# Patient Record
Sex: MALE | Race: WHITE | NOT HISPANIC OR LATINO | URBAN - METROPOLITAN AREA
[De-identification: names, ages, dates, MRNs, and addresses within clinical notes are randomized per-mention and may not be internally consistent; named-entity substitution may affect disease eponyms.]

---

## 2023-05-04 ENCOUNTER — APPOINTMENT (OUTPATIENT)
Dept: PRIMARY CARE | Facility: CLINIC | Age: 23
End: 2023-05-04
Payer: COMMERCIAL

## 2023-05-25 ENCOUNTER — OFFICE VISIT (OUTPATIENT)
Dept: PRIMARY CARE | Facility: CLINIC | Age: 23
End: 2023-05-25
Payer: COMMERCIAL

## 2023-05-25 ENCOUNTER — LAB (OUTPATIENT)
Dept: LAB | Facility: LAB | Age: 23
End: 2023-05-25
Payer: COMMERCIAL

## 2023-05-25 VITALS
BODY MASS INDEX: 18.55 KG/M2 | WEIGHT: 140 LBS | TEMPERATURE: 97.9 F | RESPIRATION RATE: 16 BRPM | OXYGEN SATURATION: 98 % | SYSTOLIC BLOOD PRESSURE: 120 MMHG | HEART RATE: 64 BPM | DIASTOLIC BLOOD PRESSURE: 70 MMHG | HEIGHT: 73 IN

## 2023-05-25 DIAGNOSIS — F90.2 ATTENTION DEFICIT HYPERACTIVITY DISORDER (ADHD), COMBINED TYPE: ICD-10-CM

## 2023-05-25 DIAGNOSIS — Z00.00 HEALTHCARE MAINTENANCE: Primary | ICD-10-CM

## 2023-05-25 DIAGNOSIS — Z00.00 HEALTHCARE MAINTENANCE: ICD-10-CM

## 2023-05-25 PROBLEM — M54.9 BACK PAIN: Status: RESOLVED | Noted: 2022-03-31 | Resolved: 2023-05-25

## 2023-05-25 PROBLEM — H66.001 ACUTE SUPPURATIVE OTITIS MEDIA OF RIGHT EAR WITHOUT SPONTANEOUS RUPTURE OF TYMPANIC MEMBRANE: Status: RESOLVED | Noted: 2023-05-25 | Resolved: 2023-05-25

## 2023-05-25 PROBLEM — H66.003 ACUTE SUPPURATIVE OTITIS MEDIA OF BOTH EARS WITHOUT SPONTANEOUS RUPTURE OF TYMPANIC MEMBRANES: Status: RESOLVED | Noted: 2023-05-25 | Resolved: 2023-05-25

## 2023-05-25 PROBLEM — R10.9 ABDOMINAL PAIN: Status: RESOLVED | Noted: 2023-05-25 | Resolved: 2023-05-25

## 2023-05-25 PROBLEM — J30.9 ALLERGIC RHINITIS: Status: ACTIVE | Noted: 2023-05-25

## 2023-05-25 PROBLEM — F90.9 ATTENTION DEFICIT HYPERACTIVITY DISORDER (ADHD): Status: ACTIVE | Noted: 2023-05-25

## 2023-05-25 LAB
ALANINE AMINOTRANSFERASE (SGPT) (U/L) IN SER/PLAS: 12 U/L (ref 10–52)
ALBUMIN (G/DL) IN SER/PLAS: 4.7 G/DL (ref 3.4–5)
ALKALINE PHOSPHATASE (U/L) IN SER/PLAS: 59 U/L (ref 33–120)
ANION GAP IN SER/PLAS: 10 MMOL/L (ref 10–20)
ASPARTATE AMINOTRANSFERASE (SGOT) (U/L) IN SER/PLAS: 15 U/L (ref 9–39)
BILIRUBIN TOTAL (MG/DL) IN SER/PLAS: 0.6 MG/DL (ref 0–1.2)
CALCIUM (MG/DL) IN SER/PLAS: 9.7 MG/DL (ref 8.6–10.3)
CARBON DIOXIDE, TOTAL (MMOL/L) IN SER/PLAS: 30 MMOL/L (ref 21–32)
CHLORIDE (MMOL/L) IN SER/PLAS: 106 MMOL/L (ref 98–107)
CHOLESTEROL (MG/DL) IN SER/PLAS: 102 MG/DL (ref 0–199)
CHOLESTEROL IN HDL (MG/DL) IN SER/PLAS: 41.9 MG/DL
CHOLESTEROL/HDL RATIO: 2.4
CREATININE (MG/DL) IN SER/PLAS: 0.84 MG/DL (ref 0.5–1.3)
GFR MALE: >90 ML/MIN/1.73M2
GLUCOSE (MG/DL) IN SER/PLAS: 74 MG/DL (ref 74–99)
LDL: 48 MG/DL (ref 0–119)
NON HDL CHOLESTEROL: 60 MG/DL (ref 0–149)
POTASSIUM (MMOL/L) IN SER/PLAS: 4 MMOL/L (ref 3.5–5.3)
PROTEIN TOTAL: 6.8 G/DL (ref 6.4–8.2)
SODIUM (MMOL/L) IN SER/PLAS: 142 MMOL/L (ref 136–145)
TRIGLYCERIDE (MG/DL) IN SER/PLAS: 63 MG/DL (ref 0–149)
UREA NITROGEN (MG/DL) IN SER/PLAS: 8 MG/DL (ref 6–23)
VLDL: 13 MG/DL (ref 0–40)

## 2023-05-25 PROCEDURE — 93000 ELECTROCARDIOGRAM COMPLETE: CPT | Performed by: FAMILY MEDICINE

## 2023-05-25 PROCEDURE — 99395 PREV VISIT EST AGE 18-39: CPT | Performed by: FAMILY MEDICINE

## 2023-05-25 PROCEDURE — 1036F TOBACCO NON-USER: CPT | Performed by: FAMILY MEDICINE

## 2023-05-25 PROCEDURE — 80061 LIPID PANEL: CPT

## 2023-05-25 PROCEDURE — 80053 COMPREHEN METABOLIC PANEL: CPT

## 2023-05-25 PROCEDURE — 36415 COLL VENOUS BLD VENIPUNCTURE: CPT

## 2023-05-25 RX ORDER — CETIRIZINE HYDROCHLORIDE 10 MG/1
TABLET ORAL
COMMUNITY

## 2023-05-25 RX ORDER — METHYLPHENIDATE HYDROCHLORIDE 27 MG/1
2 TABLET ORAL DAILY
COMMUNITY
End: 2023-05-25 | Stop reason: SDUPTHER

## 2023-05-25 RX ORDER — METHYLPHENIDATE HYDROCHLORIDE 27 MG/1
54 TABLET ORAL DAILY
Qty: 60 TABLET | Refills: 0 | Status: SHIPPED | OUTPATIENT
Start: 2023-05-25

## 2023-05-25 ASSESSMENT — PROMIS GLOBAL HEALTH SCALE
RATE_PHYSICAL_HEALTH: GOOD
RATE_AVERAGE_FATIGUE: MILD
RATE_SOCIAL_SATISFACTION: VERY GOOD
RATE_AVERAGE_PAIN: 1
RATE_MENTAL_HEALTH: GOOD
CARRYOUT_SOCIAL_ACTIVITIES: VERY GOOD
EMOTIONAL_PROBLEMS: RARELY
CARRYOUT_PHYSICAL_ACTIVITIES: MOSTLY
RATE_QUALITY_OF_LIFE: VERY GOOD
RATE_GENERAL_HEALTH: GOOD

## 2023-05-25 ASSESSMENT — ENCOUNTER SYMPTOMS
PSYCHIATRIC NEGATIVE: 1
RESPIRATORY NEGATIVE: 1
EYES NEGATIVE: 1
NEUROLOGICAL NEGATIVE: 1
ENDOCRINE NEGATIVE: 1
GASTROINTESTINAL NEGATIVE: 1
CONSTITUTIONAL NEGATIVE: 1
CARDIOVASCULAR NEGATIVE: 1

## 2023-05-25 ASSESSMENT — PATIENT HEALTH QUESTIONNAIRE - PHQ9
SUM OF ALL RESPONSES TO PHQ9 QUESTIONS 1 AND 2: 0
1. LITTLE INTEREST OR PLEASURE IN DOING THINGS: NOT AT ALL
2. FEELING DOWN, DEPRESSED OR HOPELESS: NOT AT ALL

## 2023-05-25 NOTE — PROGRESS NOTES
"Subjective   Patient ID: Ant Jones is a 23 y.o. male who presents for Annual Exam.    HPI Takes concerta prn for focus.     Review of Systems   Constitutional: Negative.    HENT: Negative.     Eyes: Negative.    Respiratory: Negative.     Cardiovascular: Negative.    Gastrointestinal: Negative.    Endocrine: Negative.    Genitourinary: Negative.    Skin: Negative.    Neurological: Negative.    Psychiatric/Behavioral: Negative.         Objective   /70 (BP Location: Left arm, Patient Position: Sitting)   Pulse 64   Temp 36.6 °C (97.9 °F)   Resp 16   Ht 1.854 m (6' 1\")   Wt 63.5 kg (140 lb)   SpO2 98%   BMI 18.47 kg/m²     Physical Exam  Constitutional:       General: He is not in acute distress.     Appearance: Normal appearance.   HENT:      Head: Normocephalic and atraumatic.      Right Ear: Tympanic membrane normal.      Left Ear: Tympanic membrane normal.      Nose: Nose normal.      Mouth/Throat:      Mouth: Mucous membranes are dry.      Pharynx: Oropharynx is clear.   Eyes:      Conjunctiva/sclera: Conjunctivae normal.      Pupils: Pupils are equal, round, and reactive to light.   Neck:      Vascular: No carotid bruit.   Cardiovascular:      Rate and Rhythm: Normal rate and regular rhythm.      Heart sounds: Normal heart sounds.   Pulmonary:      Effort: Pulmonary effort is normal.      Breath sounds: Normal breath sounds.   Abdominal:      General: Bowel sounds are normal.      Palpations: Abdomen is soft.   Musculoskeletal:      Cervical back: Neck supple. No tenderness.   Skin:     General: Skin is warm and dry.   Neurological:      General: No focal deficit present.      Mental Status: He is alert.   Psychiatric:         Mood and Affect: Mood normal.         Behavior: Behavior normal.         Assessment/Plan   Problem List Items Addressed This Visit       Attention deficit hyperactivity disorder (ADHD)    Relevant Medications    methylphenidate (Concerta) 27 mg daily tablet     Other Visit " Diagnoses       Healthcare maintenance    -  Primary    Relevant Orders    ECG 12 lead (Clinic Performed)    CBC    Comprehensive Metabolic Panel    Lipid Panel

## 2025-01-16 ENCOUNTER — APPOINTMENT (OUTPATIENT)
Facility: CLINIC | Age: 25
End: 2025-01-16
Payer: COMMERCIAL

## 2025-01-16 VITALS
HEART RATE: 65 BPM | TEMPERATURE: 98.5 F | OXYGEN SATURATION: 99 % | RESPIRATION RATE: 16 BRPM | HEIGHT: 73 IN | DIASTOLIC BLOOD PRESSURE: 60 MMHG | BODY MASS INDEX: 18.5 KG/M2 | SYSTOLIC BLOOD PRESSURE: 90 MMHG | WEIGHT: 139.6 LBS

## 2025-01-16 DIAGNOSIS — Z13.6 SCREENING FOR CARDIOVASCULAR CONDITION: Primary | ICD-10-CM

## 2025-01-16 DIAGNOSIS — Z00.00 HEALTHCARE MAINTENANCE: ICD-10-CM

## 2025-01-16 PROCEDURE — 1036F TOBACCO NON-USER: CPT | Performed by: FAMILY MEDICINE

## 2025-01-16 PROCEDURE — 99395 PREV VISIT EST AGE 18-39: CPT | Performed by: FAMILY MEDICINE

## 2025-01-16 PROCEDURE — 3008F BODY MASS INDEX DOCD: CPT | Performed by: FAMILY MEDICINE

## 2025-01-16 PROCEDURE — 93000 ELECTROCARDIOGRAM COMPLETE: CPT | Performed by: FAMILY MEDICINE

## 2025-01-16 ASSESSMENT — ENCOUNTER SYMPTOMS
RESPIRATORY NEGATIVE: 1
EYES NEGATIVE: 1
GASTROINTESTINAL NEGATIVE: 1
NEUROLOGICAL NEGATIVE: 1
CONSTITUTIONAL NEGATIVE: 1
CARDIOVASCULAR NEGATIVE: 1
PSYCHIATRIC NEGATIVE: 1
ENDOCRINE NEGATIVE: 1

## 2025-01-16 NOTE — PROGRESS NOTES
"Subjective     Patient ID: Ant Jones is a 24 y.o. male who presents for Annual Exam.      Review of Systems   Constitutional: Negative.    HENT: Negative.     Eyes: Negative.    Respiratory: Negative.     Cardiovascular: Negative.    Gastrointestinal: Negative.    Endocrine: Negative.    Genitourinary: Negative.    Skin: Negative.    Neurological: Negative.    Psychiatric/Behavioral: Negative.         Objective     Vitals:    01/16/25 1005   BP: 90/60   BP Location: Right arm   Patient Position: Sitting   Pulse: 65   Resp: 16   Temp: 36.9 °C (98.5 °F)   SpO2: 99%   Weight: 63.3 kg (139 lb 9.6 oz)   Height: 1.854 m (6' 1\")        Current Outpatient Medications   Medication Instructions    cetirizine (ZyrTEC) 10 mg tablet Take by mouth.    methylphenidate ER 54 mg, oral, Daily        Physical Exam  Constitutional:       General: He is not in acute distress.     Appearance: Normal appearance.   HENT:      Head: Normocephalic and atraumatic.      Right Ear: Tympanic membrane normal.      Left Ear: Tympanic membrane normal.      Nose: Nose normal.      Mouth/Throat:      Pharynx: Oropharynx is clear.   Eyes:      Conjunctiva/sclera: Conjunctivae normal.      Pupils: Pupils are equal, round, and reactive to light.   Neck:      Vascular: No carotid bruit.   Cardiovascular:      Rate and Rhythm: Normal rate and regular rhythm.      Heart sounds: Normal heart sounds.   Pulmonary:      Effort: Pulmonary effort is normal.      Breath sounds: Normal breath sounds.   Abdominal:      General: Bowel sounds are normal.      Palpations: Abdomen is soft.   Musculoskeletal:      Cervical back: Neck supple. No tenderness.   Skin:     General: Skin is warm and dry.   Neurological:      General: No focal deficit present.      Mental Status: He is alert.   Psychiatric:         Mood and Affect: Mood normal.         Behavior: Behavior normal.         Assessment/Plan   Diagnoses and all orders for this visit:  Screening for cardiovascular " condition  -     ECG 12 Lead  Healthcare maintenance  -     CBC; Future  -     Comprehensive Metabolic Panel; Future  -     Lipid Panel; Future

## 2025-02-14 LAB
ALBUMIN SERPL-MCNC: 5.3 G/DL (ref 3.6–5.1)
ALP SERPL-CCNC: 61 U/L (ref 36–130)
ALT SERPL-CCNC: 11 U/L (ref 9–46)
ANION GAP SERPL CALCULATED.4IONS-SCNC: 12 MMOL/L (CALC) (ref 7–17)
AST SERPL-CCNC: 17 U/L (ref 10–40)
BILIRUB SERPL-MCNC: 1 MG/DL (ref 0.2–1.2)
BUN SERPL-MCNC: 9 MG/DL (ref 7–25)
CALCIUM SERPL-MCNC: 10.2 MG/DL (ref 8.6–10.3)
CHLORIDE SERPL-SCNC: 105 MMOL/L (ref 98–110)
CHOLEST SERPL-MCNC: 127 MG/DL
CHOLEST/HDLC SERPL: 2.6 (CALC)
CO2 SERPL-SCNC: 26 MMOL/L (ref 20–32)
CREAT SERPL-MCNC: 0.74 MG/DL (ref 0.6–1.24)
EGFRCR SERPLBLD CKD-EPI 2021: 129 ML/MIN/1.73M2
ERYTHROCYTE [DISTWIDTH] IN BLOOD BY AUTOMATED COUNT: 11.7 % (ref 11–15)
GLUCOSE SERPL-MCNC: 79 MG/DL (ref 65–99)
HCT VFR BLD AUTO: 48.3 % (ref 38.5–50)
HDLC SERPL-MCNC: 48 MG/DL
HGB BLD-MCNC: 16 G/DL (ref 13.2–17.1)
LDLC SERPL CALC-MCNC: 66 MG/DL (CALC)
MCH RBC QN AUTO: 31.1 PG (ref 27–33)
MCHC RBC AUTO-ENTMCNC: 33.1 G/DL (ref 32–36)
MCV RBC AUTO: 93.8 FL (ref 80–100)
NONHDLC SERPL-MCNC: 79 MG/DL (CALC)
PLATELET # BLD AUTO: 212 THOUSAND/UL (ref 140–400)
PMV BLD REES-ECKER: 12 FL (ref 7.5–12.5)
POTASSIUM SERPL-SCNC: 4.3 MMOL/L (ref 3.5–5.3)
PROT SERPL-MCNC: 7.4 G/DL (ref 6.1–8.1)
RBC # BLD AUTO: 5.15 MILLION/UL (ref 4.2–5.8)
SODIUM SERPL-SCNC: 143 MMOL/L (ref 135–146)
TRIGL SERPL-MCNC: 46 MG/DL
WBC # BLD AUTO: 4.2 THOUSAND/UL (ref 3.8–10.8)

## 2025-02-21 ENCOUNTER — OFFICE VISIT (OUTPATIENT)
Dept: URGENT CARE | Age: 25
End: 2025-02-21
Payer: COMMERCIAL

## 2025-02-21 VITALS
HEIGHT: 74 IN | OXYGEN SATURATION: 98 % | DIASTOLIC BLOOD PRESSURE: 65 MMHG | WEIGHT: 140 LBS | BODY MASS INDEX: 17.97 KG/M2 | TEMPERATURE: 98.9 F | SYSTOLIC BLOOD PRESSURE: 106 MMHG | RESPIRATION RATE: 19 BRPM | HEART RATE: 80 BPM

## 2025-02-21 DIAGNOSIS — S09.90XA CLOSED HEAD INJURY, INITIAL ENCOUNTER: Primary | ICD-10-CM

## 2025-02-21 ASSESSMENT — ENCOUNTER SYMPTOMS
PHOTOPHOBIA: 1
NUMBNESS: 0
WEAKNESS: 0
HEADACHES: 1

## 2025-02-21 NOTE — PATIENT INSTRUCTIONS
ER if severe headache, vomiting, lethargy, confusion, numbness, weakness     Alternate tylenol and motrin for pain

## 2025-02-21 NOTE — LETTER
February 21, 2025     Patient: Ant Jones   YOB: 2000   Date of Visit: 2/21/2025       To Whom It May Concern:    It is my medical opinion that Ant Jones may return to work on 2/24/25 .    If you have any questions or concerns, please don't hesitate to call.         Sincerely,        Fina Childs PA-C    CC: No Recipients

## 2025-02-21 NOTE — PROGRESS NOTES
Subjective   Patient ID: Ant Jones is a 25 y.o. male. They present today with a chief complaint of Concussion (Hit his head on Tuesday and has been fine but today he has a headache some pain ).    History of Present Illness  Patient is a 25 year old male presenting for evaluation of a head injury. Was attempting to get out from under a table and stood up too quickly and hit back of head on the table. No LOC. No blood thinners. Reports injury occurred at work 4 days ago. Reports mild pain after the injury for a few days. Today worsened, reports was working in a loud and bright area which he thinks made it worse. Headache is global and is tension/tight feeling. Associated with photophobia. Improved with tylenol. No history of headaches or migraines prior.  Denies other injuries.       History provided by:  Patient   used: No    Concussion  Associated symptoms: headaches        Past Medical History  Allergies as of 02/21/2025 - Reviewed 02/21/2025   Allergen Reaction Noted    Cefixime Rash 04/03/2016       (Not in a hospital admission)       Past Medical History:   Diagnosis Date    Abdominal pain 05/25/2023    Acute pharyngitis, unspecified 12/02/2015    Acute pharyngitis, unspecified etiology    Acute suppurative otitis media of both ears without spontaneous rupture of tympanic membranes 05/25/2023    Acute suppurative otitis media of right ear without spontaneous rupture of tympanic membrane 05/25/2023    Acute upper respiratory infection, unspecified 12/02/2015    Upper respiratory infection, acute    Allergic     Back pain 03/31/2022    Constipation, unspecified 11/21/2016    Constipation, unspecified constipation type    Cough, unspecified 12/02/2015    Cough    Generalized abdominal pain 11/21/2016    Generalized abdominal pain    GERD (gastroesophageal reflux disease)     Personal history of other diseases of the respiratory system 12/17/2014    History of acute sinusitis    Personal  "history of other infectious and parasitic diseases 11/21/2016    History of traveler's diarrhea       Past Surgical History:   Procedure Laterality Date    WISDOM TOOTH EXTRACTION          reports that he has never smoked. He has never been exposed to tobacco smoke. He has never used smokeless tobacco. He reports current alcohol use of about 3.0 standard drinks of alcohol per week. He reports that he does not use drugs.    Review of Systems  Review of Systems   Eyes:  Positive for photophobia.   Neurological:  Positive for headaches. Negative for weakness and numbness.                                  Objective    Vitals:    02/21/25 1512   BP: 106/65   Pulse: 80   Resp: 19   Temp: 37.2 °C (98.9 °F)   SpO2: 98%   Weight: 63.5 kg (140 lb)   Height: 1.88 m (6' 2\")     No LMP for male patient.    Physical Exam  Constitutional:       General: He is not in acute distress.     Appearance: Normal appearance. He is normal weight. He is not ill-appearing or toxic-appearing.   HENT:      Head: Normocephalic. No right periorbital erythema or left periorbital erythema.      Jaw: There is normal jaw occlusion. No trismus.      Right Ear: Tympanic membrane, ear canal and external ear normal. There is no impacted cerumen.      Left Ear: Tympanic membrane, ear canal and external ear normal. There is no impacted cerumen.      Mouth/Throat:      Mouth: Mucous membranes are moist.      Pharynx: No oropharyngeal exudate or posterior oropharyngeal erythema.   Eyes:      General: No scleral icterus.        Right eye: No discharge.         Left eye: No discharge.      Conjunctiva/sclera: Conjunctivae normal.   Neck:      Trachea: Phonation normal.   Cardiovascular:      Rate and Rhythm: Normal rate and regular rhythm.      Heart sounds: Normal heart sounds. No murmur heard.     No friction rub. No gallop.   Pulmonary:      Effort: Pulmonary effort is normal. No respiratory distress.      Breath sounds: Normal breath sounds. No stridor. No " wheezing, rhonchi or rales.   Neurological:      General: No focal deficit present.      Mental Status: He is alert.      Gait: Gait normal.   Psychiatric:         Mood and Affect: Mood normal.         Behavior: Behavior normal.         Thought Content: Thought content normal.         Procedures    Point of Care Test & Imaging Results from this visit  No results found for this visit on 02/21/25.   No results found.    Diagnostic study results (if any) were reviewed by Fina Childs PA-C.    Assessment/Plan   Allergies, medications, history, and pertinent labs/EKGs/Imaging reviewed by Fina Childs PA-C.     Medical Decision Making  Patient is a 25 year old male presenting for evaluation of a headache after head injury 4 days ago at work. Offered workers comp, patient declined.  Hemodynamically stable. No signs of head trauma on exam. No hemotympanum, echavarria sign or racoon eyes. Cranial nerves 2-12 intact. No focal neuro deficit. [place] head CT rules negative. Suspect closed head injury/mild concussion. Discussed supportive care. ER if severe headache, vomiting, confusion, lethargy, weakness, numbness.     At time of discharge, patient was clinically well-appearing and appropriate for outpatient management. The patient/parent/guardian was educated regarding diagnosis, supportive care, OTC and Rx medications. The patient/parent/guardian was given the opportunity to ask questions prior to discharge. They verbalized understanding of discussion of treatment plan, expected course of illness and/or injury, indications on when to return to , when to seek further evaluation in ED/call 911, and the need to follow up with PCP and/or specialist as referred. Patient/parent/guardian was provided with work/school documentation if requested. Patient stable upon discharge.     Orders and Diagnoses  Diagnoses and all orders for this visit:  Closed head injury, initial encounter      Medical Admin Record      Patient  disposition: Home    Electronically signed by Fina Childs PA-C  3:45 PM

## 2025-04-21 ENCOUNTER — OFFICE VISIT (OUTPATIENT)
Dept: URGENT CARE | Age: 25
End: 2025-04-21
Payer: COMMERCIAL

## 2025-04-21 VITALS
HEART RATE: 79 BPM | RESPIRATION RATE: 16 BRPM | HEIGHT: 73 IN | TEMPERATURE: 98.2 F | DIASTOLIC BLOOD PRESSURE: 64 MMHG | WEIGHT: 134 LBS | SYSTOLIC BLOOD PRESSURE: 115 MMHG | OXYGEN SATURATION: 99 % | BODY MASS INDEX: 17.76 KG/M2

## 2025-04-21 DIAGNOSIS — N50.812 PAIN IN LEFT TESTICLE: Primary | ICD-10-CM

## 2025-04-21 PROCEDURE — 99213 OFFICE O/P EST LOW 20 MIN: CPT | Performed by: FAMILY MEDICINE

## 2025-04-21 PROCEDURE — 1036F TOBACCO NON-USER: CPT | Performed by: FAMILY MEDICINE

## 2025-04-21 PROCEDURE — 3008F BODY MASS INDEX DOCD: CPT | Performed by: FAMILY MEDICINE

## 2025-04-21 ASSESSMENT — PATIENT HEALTH QUESTIONNAIRE - PHQ9
1. LITTLE INTEREST OR PLEASURE IN DOING THINGS: NOT AT ALL
2. FEELING DOWN, DEPRESSED OR HOPELESS: NOT AT ALL
SUM OF ALL RESPONSES TO PHQ9 QUESTIONS 1 AND 2: 0

## 2025-04-21 NOTE — PROGRESS NOTES
"Subjective   Patient ID: Ant Jones is a 25 y.o. male. They present today with a chief complaint of left testicle pain (2 days ago. Continuous. Radiating down left leg, depending on patients positioning. No injury. No urinary problems).    History of Present Illness  HPI    Past Medical History  Allergies as of 04/21/2025 - Reviewed 04/21/2025   Allergen Reaction Noted    Cefixime Rash 04/03/2016       Prescriptions Prior to Admission[1]     Medical History[2]    Surgical History[3]     reports that he has never smoked. He has never been exposed to tobacco smoke. He has never used smokeless tobacco. He reports current alcohol use of about 3.0 standard drinks of alcohol per week. He reports that he does not use drugs.    Review of Systems  Review of Systems     As in history of present illness                          Objective    Vitals:    04/21/25 1507   BP: 115/64   BP Location: Right arm   Patient Position: Sitting   BP Cuff Size: Large adult   Pulse: 79   Resp: 16   Temp: 36.8 °C (98.2 °F)   TempSrc: Oral   SpO2: 99%   Weight: 60.8 kg (134 lb)   Height: 1.854 m (6' 1\")     No LMP for male patient.    Physical Exam  Alert and oriented, no apparent distress  Abdomen soft, nontender, nondistended.   Left hip-normal passive range of motion in flexion extension internal and external rotation with no elicited pain  Skin shows no rashes sores or lesions   exam shows no areas of significant tenderness, no mass and no swelling over left testicle, scrotum or epididymis  Procedures    Point of Care Test & Imaging Results from this visit  No results found for this visit on 04/21/25.   Imaging  No results found.    Cardiology, Vascular, and Other Imaging  No other imaging results found for the past 2 days      Diagnostic study results (if any) were reviewed by Jeff Noel MD.    Assessment/Plan   Allergies, medications, history, and pertinent labs/EKGs/Imaging reviewed by Jeff Noel MD.     Medical Decision " Making  At time of discharge patient was clinically well-appearing and HDS for outpatient management. The patient and/or family was educated regarding diagnosis, supportive care, OTC and Rx medications. The patient and/or family was given the opportunity to ask questions prior to discharge.  They verbalized understanding of my discussion of the plans for treatment, expected course, indications to return to  or seek further evaluation in ED, and the need for timely follow up as directed.   They were provided with a work/school excuse if requested.    Orders and Diagnoses  Diagnoses and all orders for this visit:  Pain in left testicle      Medical Admin Record      Patient disposition: Home    Electronically signed by Jeff Noel MD  3:31 PM           [1] (Not in a hospital admission)   [2]   Past Medical History:  Diagnosis Date    Abdominal pain 05/25/2023    Acute pharyngitis, unspecified 12/02/2015    Acute pharyngitis, unspecified etiology    Acute suppurative otitis media of both ears without spontaneous rupture of tympanic membranes 05/25/2023    Acute suppurative otitis media of right ear without spontaneous rupture of tympanic membrane 05/25/2023    Acute upper respiratory infection, unspecified 12/02/2015    Upper respiratory infection, acute    Allergic     Back pain 03/31/2022    Constipation, unspecified 11/21/2016    Constipation, unspecified constipation type    Cough, unspecified 12/02/2015    Cough    Generalized abdominal pain 11/21/2016    Generalized abdominal pain    GERD (gastroesophageal reflux disease)     Personal history of other diseases of the respiratory system 12/17/2014    History of acute sinusitis    Personal history of other infectious and parasitic diseases 11/21/2016    History of traveler's diarrhea   [3]   Past Surgical History:  Procedure Laterality Date    WISDOM TOOTH EXTRACTION

## 2025-04-21 NOTE — PATIENT INSTRUCTIONS
Increase fluids and rest  Tylenol as needed for mild discomfort  Go to ER for increasing pain or noticeable swelling  Follow-up with PCP in the next 1 to 2 weeks as planned to recheck  Return to clinic if urinary symptoms occur

## 2025-05-19 DIAGNOSIS — G89.29 CHRONIC PAIN OF LEFT KNEE: Primary | ICD-10-CM

## 2025-05-19 DIAGNOSIS — M25.562 CHRONIC PAIN OF LEFT KNEE: Primary | ICD-10-CM

## 2025-07-15 ENCOUNTER — OFFICE VISIT (OUTPATIENT)
Dept: URGENT CARE | Age: 25
End: 2025-07-15
Payer: COMMERCIAL

## 2025-07-15 VITALS
WEIGHT: 140 LBS | HEIGHT: 74 IN | SYSTOLIC BLOOD PRESSURE: 105 MMHG | DIASTOLIC BLOOD PRESSURE: 64 MMHG | OXYGEN SATURATION: 98 % | BODY MASS INDEX: 17.97 KG/M2 | HEART RATE: 74 BPM | RESPIRATION RATE: 18 BRPM | TEMPERATURE: 98.3 F

## 2025-07-15 DIAGNOSIS — J02.9 SORE THROAT: ICD-10-CM

## 2025-07-15 LAB — POC RAPID STREP: NEGATIVE

## 2025-07-15 PROCEDURE — 1036F TOBACCO NON-USER: CPT | Performed by: FAMILY MEDICINE

## 2025-07-15 PROCEDURE — 99213 OFFICE O/P EST LOW 20 MIN: CPT | Performed by: FAMILY MEDICINE

## 2025-07-15 PROCEDURE — 3008F BODY MASS INDEX DOCD: CPT | Performed by: FAMILY MEDICINE

## 2025-07-15 PROCEDURE — 87880 STREP A ASSAY W/OPTIC: CPT | Performed by: FAMILY MEDICINE

## 2025-07-15 NOTE — PATIENT INSTRUCTIONS
Chloraseptic spray or lozenges as needed  Gargle with lightly salted water several times a day  Motrin or Tylenol as needed  Increase fluids and rest  Consider home COVID testing if not improving  Follow-up if symptoms worsen

## 2025-07-15 NOTE — PROGRESS NOTES
"Subjective   Patient ID: Ant Jones is a 25 y.o. male. They present today with a chief complaint of Illness (Swollen gland and painful to swallow - Entered by patient).    History of Present Illness  HPI  Days of sore throat. Mild nasal congestion with postnasal drip. No fevers or chills. No eye redness, discharge or itching. No nausea vomiting or diarrhea. No chest pain, shortness of breath or wheezing noted. No rashes or skin lesions noted. No known exposures to Mono, strep, pneumonia or influenza.  Patient refusing testing for COVID and flu over-the-counter medications taken for symptoms. Nonsmoker.    Past Medical History  Allergies as of 07/15/2025 - Reviewed 07/15/2025   Allergen Reaction Noted    Cefixime Rash 04/03/2016       Prescriptions Prior to Admission[1]     Medical History[2]    Surgical History[3]     reports that he has never smoked. He has never been exposed to tobacco smoke. He has never used smokeless tobacco. He reports current alcohol use of about 3.0 standard drinks of alcohol per week. He reports that he does not use drugs.    Review of Systems  Review of Systems       As in history of present illness                        Objective    Vitals:    07/15/25 1152   BP: 105/64   Pulse: 74   Resp: 18   Temp: 36.8 °C (98.3 °F)   TempSrc: Oral   SpO2: 98%   Weight: 63.5 kg (140 lb)   Height: 1.88 m (6' 2\")     No LMP for male patient.    Physical Exam  Gen- A&O. NAD at rest. Swallowing appears uncomfortable  Ears- canals and TM's appear normal bilaterally with no erythema or effusion  OP- mildly erythema without exudates. Some mucous in posterier OP   Neck- mild anterior lymphadenopathy  Lungs- clear to auscultaion without wheezes or rhonchi  Skin-no rashes, hives or lesions  Procedures    Point of Care Test & Imaging Results from this visit  No results found for this visit on 07/15/25.   Imaging  No results found.    Cardiology, Vascular, and Other Imaging  No other imaging results found for " the past 2 days      Diagnostic study results (if any) were reviewed by Jeff Noel MD.    Assessment/Plan   Allergies, medications, history, and pertinent labs/EKGs/Imaging reviewed by Jeff Noel MD.     Medical Decision Making  At time of discharge patient was clinically well-appearing and HDS for outpatient management. The patient and/or family was educated regarding diagnosis, supportive care, OTC and Rx medications. The patient and/or family was given the opportunity to ask questions prior to discharge.  They verbalized understanding of my discussion of the plans for treatment, expected course, indications to return to  or seek further evaluation in ED, and the need for timely follow up as directed.   They were provided with a work/school excuse if requested.    Orders and Diagnoses  Diagnoses and all orders for this visit:  Sore throat  -     POCT rapid strep A manually resulted      Medical Admin Record      Patient disposition: Home    Electronically signed by Jeff Noel MD  12:09 PM           [1] (Not in a hospital admission)   [2]   Past Medical History:  Diagnosis Date    Abdominal pain 05/25/2023    Acute pharyngitis, unspecified 12/02/2015    Acute pharyngitis, unspecified etiology    Acute suppurative otitis media of both ears without spontaneous rupture of tympanic membranes 05/25/2023    Acute suppurative otitis media of right ear without spontaneous rupture of tympanic membrane 05/25/2023    Acute upper respiratory infection, unspecified 12/02/2015    Upper respiratory infection, acute    Allergic     Back pain 03/31/2022    Constipation, unspecified 11/21/2016    Constipation, unspecified constipation type    Cough, unspecified 12/02/2015    Cough    Generalized abdominal pain 11/21/2016    Generalized abdominal pain    GERD (gastroesophageal reflux disease)     Personal history of other diseases of the respiratory system 12/17/2014    History of acute sinusitis    Personal history of  other infectious and parasitic diseases 11/21/2016    History of traveler's diarrhea   [3]   Past Surgical History:  Procedure Laterality Date    WISDOM TOOTH EXTRACTION

## 2026-01-19 ENCOUNTER — APPOINTMENT (OUTPATIENT)
Facility: CLINIC | Age: 26
End: 2026-01-19
Payer: COMMERCIAL